# Patient Record
Sex: MALE | NOT HISPANIC OR LATINO | URBAN - METROPOLITAN AREA
[De-identification: names, ages, dates, MRNs, and addresses within clinical notes are randomized per-mention and may not be internally consistent; named-entity substitution may affect disease eponyms.]

---

## 2023-02-23 ENCOUNTER — OFFICE (OUTPATIENT)
Dept: URBAN - METROPOLITAN AREA CLINIC 67 | Facility: CLINIC | Age: 38
End: 2023-02-23
Payer: COMMERCIAL

## 2023-02-23 VITALS
RESPIRATION RATE: 14 BRPM | SYSTOLIC BLOOD PRESSURE: 122 MMHG | HEIGHT: 65 IN | HEART RATE: 81 BPM | DIASTOLIC BLOOD PRESSURE: 84 MMHG | WEIGHT: 171 LBS

## 2023-02-23 DIAGNOSIS — K70.30 ALCOHOLIC CIRRHOSIS OF LIVER WITHOUT ASCITES: ICD-10-CM

## 2023-02-23 DIAGNOSIS — R12 HEARTBURN: ICD-10-CM

## 2023-02-23 DIAGNOSIS — R10.13 EPIGASTRIC PAIN: ICD-10-CM

## 2023-02-23 PROCEDURE — 99204 OFFICE O/P NEW MOD 45 MIN: CPT | Performed by: SPECIALIST

## 2023-02-23 RX ORDER — PANTOPRAZOLE 40 MG/1
40 TABLET, DELAYED RELEASE ORAL
Qty: 30 | Refills: 3 | Status: ACTIVE
Start: 2023-02-23

## 2024-11-07 ENCOUNTER — OFFICE VISIT (OUTPATIENT)
Dept: URBAN - METROPOLITAN AREA CLINIC 113 | Facility: CLINIC | Age: 39
End: 2024-11-07

## 2024-11-07 NOTE — HPI-TODAY'S VISIT:
This is a 39-year-old male with a history of peripheral neuropathy, H. pylori infection, alcoholic cirrhosis, esophageal varices referred from Dr. Narvaez for cirrhosis and esophageal varices.  Labs 7/20/2024: CBC: WBC 7.8, hemoglobin 16, MCV 84.4, platelets 72.  BMP normal with exception of BUN 7, protein 8.7, albumin 5.2 and notable for sodium 4.1, creatinine 0.74.  TB 1.4, ALP 64, ALT 29, AST 40.  Lipase 114.  Troponin less than 0.20.  CT abdomen pelvis with contrast 7/20/2024: No acute findings in abdomen and pelvis.  Cirrhotic hepatic morphology.  Splenomegaly.  Gastroesophageal varices.  AP chest 7/20/2024: Nothing acute.